# Patient Record
Sex: MALE | Race: WHITE
[De-identification: names, ages, dates, MRNs, and addresses within clinical notes are randomized per-mention and may not be internally consistent; named-entity substitution may affect disease eponyms.]

---

## 2019-06-19 ENCOUNTER — HOSPITAL ENCOUNTER (OUTPATIENT)
Dept: HOSPITAL 76 - LAB | Age: 79
Discharge: HOME | End: 2019-06-19
Attending: PHYSICIAN ASSISTANT
Payer: MEDICARE

## 2019-06-19 DIAGNOSIS — N30.01: Primary | ICD-10-CM

## 2019-06-19 LAB
ALBUMIN DIAFP-MCNC: 3.7 G/DL (ref 3.2–5.5)
ALBUMIN/GLOB SERPL: 1.2 {RATIO} (ref 1–2.2)
ALP SERPL-CCNC: 75 IU/L (ref 42–121)
ALT SERPL W P-5'-P-CCNC: 26 IU/L (ref 10–60)
ANION GAP SERPL CALCULATED.4IONS-SCNC: 11 MMOL/L (ref 6–13)
AST SERPL W P-5'-P-CCNC: 29 IU/L (ref 10–42)
BASOPHILS NFR BLD AUTO: 0 10^3/UL (ref 0–0.1)
BASOPHILS NFR BLD AUTO: 0.7 %
BILIRUB BLD-MCNC: 0.7 MG/DL (ref 0.2–1)
BUN SERPL-MCNC: 21 MG/DL (ref 6–20)
CALCIUM UR-MCNC: 9 MG/DL (ref 8.5–10.3)
CHLORIDE SERPL-SCNC: 101 MMOL/L (ref 101–111)
CO2 SERPL-SCNC: 26 MMOL/L (ref 21–32)
CREAT SERPLBLD-SCNC: 0.7 MG/DL (ref 0.6–1.2)
EOSINOPHIL # BLD AUTO: 0.2 10^3/UL (ref 0–0.7)
EOSINOPHIL NFR BLD AUTO: 3.5 %
ERYTHROCYTE [DISTWIDTH] IN BLOOD BY AUTOMATED COUNT: 12.2 % (ref 12–15)
GFRSERPLBLD MDRD-ARVRAT: 108 ML/MIN/{1.73_M2} (ref 89–?)
GLOBULIN SER-MCNC: 3 G/DL (ref 2.1–4.2)
GLUCOSE SERPL-MCNC: 194 MG/DL (ref 70–100)
HGB UR QL STRIP: 13 G/DL (ref 14–18)
LYMPHOCYTES # SPEC AUTO: 2 10^3/UL (ref 1.5–3.5)
LYMPHOCYTES NFR BLD AUTO: 36.3 %
MCH RBC QN AUTO: 29.5 PG (ref 27–31)
MCHC RBC AUTO-ENTMCNC: 31.5 G/DL (ref 32–36)
MCV RBC AUTO: 93.7 FL (ref 80–94)
MONOCYTES # BLD AUTO: 0.5 10^3/UL (ref 0–1)
MONOCYTES NFR BLD AUTO: 8.3 %
NEUTROPHILS # BLD AUTO: 2.8 10^3/UL (ref 1.5–6.6)
NEUTROPHILS # SNV AUTO: 5.4 X10^3/UL (ref 4.8–10.8)
NEUTROPHILS NFR BLD AUTO: 50.8 %
PDW BLD AUTO: 10.4 FL (ref 7.4–11.4)
PLATELET # BLD: 179 10^3/UL (ref 130–450)
PROT SPEC-MCNC: 6.7 G/DL (ref 6.7–8.2)
RBC MAR: 4.41 10^6/UL (ref 4.7–6.1)
SODIUM SERPLBLD-SCNC: 138 MMOL/L (ref 135–145)

## 2019-06-19 PROCEDURE — 36415 COLL VENOUS BLD VENIPUNCTURE: CPT

## 2019-06-19 PROCEDURE — 80053 COMPREHEN METABOLIC PANEL: CPT

## 2019-06-19 PROCEDURE — 85025 COMPLETE CBC W/AUTO DIFF WBC: CPT

## 2019-11-13 ENCOUNTER — HOSPITAL ENCOUNTER (OUTPATIENT)
Dept: HOSPITAL 76 - LAB | Age: 79
Discharge: HOME | End: 2019-11-13
Attending: UROLOGY
Payer: MEDICARE

## 2019-11-13 DIAGNOSIS — N40.1: ICD-10-CM

## 2019-11-13 DIAGNOSIS — R35.0: ICD-10-CM

## 2019-11-13 DIAGNOSIS — Z80.42: ICD-10-CM

## 2019-11-13 DIAGNOSIS — N50.0: Primary | ICD-10-CM

## 2019-11-13 PROCEDURE — 81599 UNLISTED MAAA: CPT

## 2019-11-13 PROCEDURE — 84402 ASSAY OF FREE TESTOSTERONE: CPT

## 2019-11-13 PROCEDURE — 84403 ASSAY OF TOTAL TESTOSTERONE: CPT

## 2019-11-13 PROCEDURE — 36415 COLL VENOUS BLD VENIPUNCTURE: CPT

## 2019-11-13 PROCEDURE — 84153 ASSAY OF PSA TOTAL: CPT

## 2020-06-12 ENCOUNTER — HOSPITAL ENCOUNTER (OUTPATIENT)
Dept: HOSPITAL 76 - EMS | Age: 80
Discharge: ANOTHER HOSPITAL | End: 2020-06-12
Attending: SURGERY
Payer: MEDICARE

## 2020-06-12 DIAGNOSIS — I21.4: Primary | ICD-10-CM

## 2020-11-17 ENCOUNTER — HOSPITAL ENCOUNTER (OUTPATIENT)
Dept: HOSPITAL 76 - COV | Age: 80
Discharge: HOME | End: 2020-11-17
Attending: INTERNAL MEDICINE
Payer: MEDICARE

## 2020-11-17 DIAGNOSIS — Z01.812: Primary | ICD-10-CM

## 2020-11-17 DIAGNOSIS — Z20.828: ICD-10-CM

## 2020-11-30 ENCOUNTER — HOSPITAL ENCOUNTER (OUTPATIENT)
Dept: HOSPITAL 76 - EMS | Age: 80
End: 2020-11-30
Attending: SURGERY
Payer: MEDICARE

## 2020-11-30 DIAGNOSIS — R04.0: Primary | ICD-10-CM

## 2021-03-24 ENCOUNTER — HOSPITAL ENCOUNTER (EMERGENCY)
Dept: HOSPITAL 76 - ED | Age: 81
Discharge: HOME | End: 2021-03-24
Payer: MEDICARE

## 2021-03-24 VITALS — SYSTOLIC BLOOD PRESSURE: 127 MMHG | DIASTOLIC BLOOD PRESSURE: 75 MMHG

## 2021-03-24 DIAGNOSIS — Z79.82: ICD-10-CM

## 2021-03-24 DIAGNOSIS — R06.02: ICD-10-CM

## 2021-03-24 DIAGNOSIS — R94.31: ICD-10-CM

## 2021-03-24 DIAGNOSIS — Z87.891: ICD-10-CM

## 2021-03-24 DIAGNOSIS — R53.83: Primary | ICD-10-CM

## 2021-03-24 DIAGNOSIS — Z95.5: ICD-10-CM

## 2021-03-24 DIAGNOSIS — I10: ICD-10-CM

## 2021-03-24 DIAGNOSIS — Z79.01: ICD-10-CM

## 2021-03-24 DIAGNOSIS — I25.10: ICD-10-CM

## 2021-03-24 LAB
ALBUMIN DIAFP-MCNC: 3.9 G/DL (ref 3.2–5.5)
ALBUMIN/GLOB SERPL: 1.4 {RATIO} (ref 1–2.2)
ALP SERPL-CCNC: 100 IU/L (ref 42–121)
ALT SERPL W P-5'-P-CCNC: 31 IU/L (ref 10–60)
ANION GAP SERPL CALCULATED.4IONS-SCNC: 10 MMOL/L (ref 6–13)
AST SERPL W P-5'-P-CCNC: 32 IU/L (ref 10–42)
BASOPHILS NFR BLD AUTO: 0 10^3/UL (ref 0–0.1)
BASOPHILS NFR BLD AUTO: 0.4 %
BILIRUB BLD-MCNC: 1.4 MG/DL (ref 0.2–1)
BUN SERPL-MCNC: 20 MG/DL (ref 6–20)
CALCIUM UR-MCNC: 9.2 MG/DL (ref 8.5–10.3)
CHLORIDE SERPL-SCNC: 101 MMOL/L (ref 101–111)
CHOLEST SERPL-MCNC: 135 MG/DL
CK SERPL-CCNC: 127 IU/L (ref 22–269)
CLARITY UR REFRACT.AUTO: CLEAR
CO2 SERPL-SCNC: 26 MMOL/L (ref 21–32)
CREAT SERPLBLD-SCNC: 0.8 MG/DL (ref 0.6–1.2)
EOSINOPHIL # BLD AUTO: 0.3 10^3/UL (ref 0–0.7)
EOSINOPHIL NFR BLD AUTO: 3.6 %
ERYTHROCYTE [DISTWIDTH] IN BLOOD BY AUTOMATED COUNT: 13.1 % (ref 12–15)
GFRSERPLBLD MDRD-ARVRAT: 92 ML/MIN/{1.73_M2} (ref 89–?)
GLOBULIN SER-MCNC: 2.8 G/DL (ref 2.1–4.2)
GLUCOSE SERPL-MCNC: 141 MG/DL (ref 70–100)
GLUCOSE UR QL STRIP.AUTO: NEGATIVE MG/DL
HCT VFR BLD AUTO: 43.4 % (ref 42–52)
HDLC SERPL-MCNC: 48 MG/DL
HDLC SERPL: 2.8 {RATIO} (ref ?–5)
HGB UR QL STRIP: 14 G/DL (ref 14–18)
KETONES UR QL STRIP.AUTO: NEGATIVE MG/DL
LDLC SERPL CALC-MCNC: 58 MG/DL
LDLC SERPL-MCNC: 61 MG/DL
LDLC/HDLC SERPL: 1.2 {RATIO} (ref ?–3.6)
LIPASE SERPL-CCNC: 24 U/L (ref 22–51)
LYMPHOCYTES # SPEC AUTO: 1.3 10^3/UL (ref 1.5–3.5)
LYMPHOCYTES NFR BLD AUTO: 19.2 %
MCH RBC QN AUTO: 30.4 PG (ref 27–31)
MCHC RBC AUTO-ENTMCNC: 32.3 G/DL (ref 32–36)
MCV RBC AUTO: 94.3 FL (ref 80–94)
MONOCYTES # BLD AUTO: 0.7 10^3/UL (ref 0–1)
MONOCYTES NFR BLD AUTO: 9.6 %
NEUTROPHILS # BLD AUTO: 4.7 10^3/UL (ref 1.5–6.6)
NEUTROPHILS # SNV AUTO: 7 X10^3/UL (ref 4.8–10.8)
NEUTROPHILS NFR BLD AUTO: 67.1 %
NITRITE UR QL STRIP.AUTO: NEGATIVE
NRBC # BLD AUTO: 0 /100WBC
NRBC # BLD AUTO: 0 X10^3/UL
PDW BLD AUTO: 12.2 FL (ref 7.4–11.4)
PH UR STRIP.AUTO: 6 PH (ref 5–7.5)
PLATELET # BLD: 144 10^3/UL (ref 130–450)
POTASSIUM SERPL-SCNC: 3.7 MMOL/L (ref 3.5–5)
PROT SPEC-MCNC: 6.7 G/DL (ref 6.7–8.2)
PROT UR STRIP.AUTO-MCNC: NEGATIVE MG/DL
RBC # UR STRIP.AUTO: NEGATIVE /UL
RBC MAR: 4.6 10^6/UL (ref 4.7–6.1)
SODIUM SERPLBLD-SCNC: 137 MMOL/L (ref 135–145)
SP GR UR STRIP.AUTO: <=1.005 (ref 1–1.03)
SQUAMOUS URNS QL MICRO: (no result)
TRIGL P FAST SERPL-MCNC: 147 MG/DL
UROBILINOGEN UR QL STRIP.AUTO: (no result) E.U./DL
UROBILINOGEN UR STRIP.AUTO-MCNC: NEGATIVE MG/DL
VLDLC SERPL-SCNC: 29 MG/DL
WBC # UR MANUAL: (no result) /HPF (ref 0–3)

## 2021-03-24 PROCEDURE — 93005 ELECTROCARDIOGRAM TRACING: CPT

## 2021-03-24 PROCEDURE — 83880 ASSAY OF NATRIURETIC PEPTIDE: CPT

## 2021-03-24 PROCEDURE — 87086 URINE CULTURE/COLONY COUNT: CPT

## 2021-03-24 PROCEDURE — 83690 ASSAY OF LIPASE: CPT

## 2021-03-24 PROCEDURE — 85025 COMPLETE CBC W/AUTO DIFF WBC: CPT

## 2021-03-24 PROCEDURE — 80061 LIPID PANEL: CPT

## 2021-03-24 PROCEDURE — 83721 ASSAY OF BLOOD LIPOPROTEIN: CPT

## 2021-03-24 PROCEDURE — 84443 ASSAY THYROID STIM HORMONE: CPT

## 2021-03-24 PROCEDURE — 36415 COLL VENOUS BLD VENIPUNCTURE: CPT

## 2021-03-24 PROCEDURE — 82550 ASSAY OF CK (CPK): CPT

## 2021-03-24 PROCEDURE — 81001 URINALYSIS AUTO W/SCOPE: CPT

## 2021-03-24 PROCEDURE — 80053 COMPREHEN METABOLIC PANEL: CPT

## 2021-03-24 PROCEDURE — 84484 ASSAY OF TROPONIN QUANT: CPT

## 2021-03-24 PROCEDURE — 99284 EMERGENCY DEPT VISIT MOD MDM: CPT

## 2021-03-24 NOTE — XRAY REPORT
PROCEDURE:  Chest 1 View X-Ray

 

INDICATIONS:  Chest pain

 

TECHNIQUE:  One view of the chest was acquired.  

 

COMPARISON:  6/12/2020.

 

FINDINGS:  

 

Surgical changes and devices:  None.  

 

Lungs and pleura:  No pleural effusions or pneumothorax.  Lungs are clear.  

 

Mediastinum:  Mediastinal contours appear normal.  Heart size is normal.  

 

Bones and chest wall:  No suspicious bony lesions.  Overlying soft tissues appear unremarkable.  

 

 

IMPRESSION:  

 

No acute cardiopulmonary disease process.

 

Reviewed by: Yasmine Juárez MD, PhD on 3/24/2021 11:20 AM PDT

Approved by: Yasmine Juárez MD, PhD on 3/24/2021 11:20 AM PDT

 

 

Station ID:  [id number]

## 2021-03-24 NOTE — ED PHYSICIAN DOCUMENTATION
History of Present Illness





- Stated complaint


Stated Complaint: SOA/LIGHT HEADED





- Chief complaint


Chief Complaint: Cardiac





- Additonal information


Additional information: 


81-year-old male presents the emergency department for evaluation of worsening 

fatigue and weakness after reported event about 1 week ago in which she was 

sitting and went to get up but got progressively dizzy and lightheaded and felt 

as though he was going to faint.  He states that since that event 1 week ago he 

has not felt right since he denies chest pain.  He does endorse shortness of 

breath that is not new from baseline.





He did have an NSTEMI event about 1 year ago in which he ultimately had 5 stents

placed in his heart.  He is currently on Plavix and aspirin and is followed by 

cardiology through [facility].  He last saw cardiology in 

January 2021 with a plan to have a 3-month follow-up to include an 

echocardiogram.  His echo is currently scheduled for mid April with the 

appointment with cardiology to follow.





At this time patient denies any cough, fevers, chest pain.  He has no leg 

swelling, nausea vomiting.  No focal or unilateral weakness.  No diarrhea.  He 

does endorse some dysuria but states that it is normal for him.





Former smoker, quit almost 50 years ago








Review of Systems


Constitutional: reports: Fatigue.  denies: Fever, Chills


Eyes: reports: Reviewed and negative


Ears: reports: Reviewed and negative


Nose: reports: Rhinorrhea / runny nose


Throat: reports: Reviewed and negative


Cardiac: denies: Chest pain / pressure, Palpitations, Pedal edema, Calf pain


Respiratory: reports: Dyspnea.  denies: Cough, Hemoptysis, Wheezing


GI: denies: Abdominal Pain, Nausea, Vomiting


: reports: Dysuria.  denies: Frequency, Hesitancy


Skin: denies: Rash, Lesions, Abrasion (s)


Musculoskeletal: denies: Neck pain, Back pain, Extremity pain


Neurologic: reports: Near syncope.  denies: Generalized weakness, Syncope, 

Confused, Altered mental status, Headache, LOC





PD PAST MEDICAL HISTORY





- Past Medical History


Cardiovascular: Hypertension, High cholesterol, Coronary artery disease


Respiratory: Other


GI: Other (reflux)





- Past Surgical History


Past Surgical History: Yes


General: Other (Nissen)


Cardiovascular: Coronary stent





- Present Medications


Home Medications: 


                                Ambulatory Orders











 Medication  Instructions  Recorded  Confirmed


 


Multivitamin [Multiple Vitamins] 1 each PO 03/15/17 03/15/17


 


Aspirin 81 mg DAILY 06/12/20 06/12/20


 


Atorvastatin Calcium 80 mg DAILY 06/12/20 06/12/20


 


Clopidogrel [Plavix] 1 tab DAILY 03/24/21 03/24/21


 


Metoprolol Succinate [Toprol Xl] 25 mg PO 03/24/21 


 


Omeprazole 1 tab DAILY 03/24/21 03/24/21


 


Tamsulosin [Flomax] 1 tab DAILY 03/24/21 03/24/21














- Allergies


Allergies/Adverse Reactions: 


                                    Allergies











Allergy/AdvReac Type Severity Reaction Status Date / Time


 


No Known Drug Allergies Allergy   Verified 03/24/21 11:02














- Social History


Does the pt smoke?: No


Smoking Status: Never smoker





PD ED PE EXPANDED





- General


General: Alert, No acute distress, Well developed/nourished





- Neck


Neck: Supple w/out meningeal sx, No tenderness.  No: JVD present, Adenopathy, 

Thyroid enlarged / mass





- Cardiac


Cardiac: Regular Rate, Regular Rhythm, Radial strong equal, Pedal strong equal, 

Cap refill < 2 sec





- Respiratory


Respiratory: Clear to ausultation mikael.  No: Distress, Labored





- Abdomen


Abdomen: Normal Bowel sounds.  No: Tender to palpation





- Back


Back: Normal exam, Soft tissue tenderness.  No: CVA TTP right, CVA TTP left





- Derm


Derm: Normal color, Warm and dry.  No: Rash





- Neuro


Neuro: Alert and Oriented X 3, CNII-XII intact





- GCS


Eye Opening: Spontaneous


Motor: Obeys Commands


Verbal: Oriented


Total: 15





Results





- Vitals


Vitals: 


                               Vital Signs - 24 hr











  03/24/21 03/24/21





  11:02 11:30


 


Temperature 36.4 C L 


 


Heart Rate 64 61


 


Respiratory 18 18





Rate  


 


Blood Pressure 127/68 127/75


 


O2 Saturation 100 100








                                     Oxygen











O2 Source                      Room air

















- EKG (time done)


  ** 1113


Rate: Rate (enter#) (59)


Rhythm: NSR


Axis: Normal


Intervals: Normal DC, Prolonged QT


QRS: Poor R wave progression


Ischemia: Normal ST segments


Compare to prior EKG: Unchanged from prior EKG


Computer interpretation: Agree with computer





- Labs


Labs: 


                                Laboratory Tests











  03/24/21 03/24/21 03/24/21





  11:12 11:12 11:12


 


WBC  7.0  


 


RBC  4.60 L  


 


Hgb  14.0  


 


Hct  43.4  


 


MCV  94.3 H  


 


MCH  30.4  


 


MCHC  32.3  


 


RDW  13.1  


 


Plt Count  144  


 


MPV  12.2 H  


 


Neut # (Auto)  4.7  


 


Lymph # (Auto)  1.3 L  


 


Mono # (Auto)  0.7  


 


Eos # (Auto)  0.3  


 


Baso # (Auto)  0.0  


 


Absolute Nucleated RBC  0.00  


 


Nucleated RBC %  0.0  


 


Sodium   137 


 


Potassium   3.7 


 


Chloride   101 


 


Carbon Dioxide   26 


 


Anion Gap   10.0 


 


BUN   20 


 


Creatinine   0.8 


 


Estimated GFR (MDRD)   92 


 


Glucose   141 H 


 


Calcium   9.2 


 


Total Bilirubin   1.4 H 


 


AST   32 


 


ALT   31 


 


Alkaline Phosphatase   100 


 


Troponin I High Sens    6.9


 


B-Natriuretic Peptide   


 


Total Protein   6.7 


 


Albumin   3.9 


 


Globulin   2.8 


 


Albumin/Globulin Ratio   1.4 


 


Lipase   24 


 


TSH   


 


Urine Color   


 


Urine Clarity   


 


Urine pH   


 


Ur Specific Gravity   


 


Urine Protein   


 


Urine Glucose (UA)   


 


Urine Ketones   


 


Urine Occult Blood   


 


Urine Nitrite   


 


Urine Bilirubin   


 


Urine Urobilinogen   


 


Ur Leukocyte Esterase   


 


Urine RBC   


 


Urine WBC   


 


Ur Squamous Epith Cells   


 


Urine Bacteria   


 


Urine Culture Comments   














  03/24/21 03/24/21 03/24/21





  11:23 11:34 11:34


 


WBC   


 


RBC   


 


Hgb   


 


Hct   


 


MCV   


 


MCH   


 


MCHC   


 


RDW   


 


Plt Count   


 


MPV   


 


Neut # (Auto)   


 


Lymph # (Auto)   


 


Mono # (Auto)   


 


Eos # (Auto)   


 


Baso # (Auto)   


 


Absolute Nucleated RBC   


 


Nucleated RBC %   


 


Sodium   


 


Potassium   


 


Chloride   


 


Carbon Dioxide   


 


Anion Gap   


 


BUN   


 


Creatinine   


 


Estimated GFR (MDRD)   


 


Glucose   


 


Calcium   


 


Total Bilirubin   


 


AST   


 


ALT   


 


Alkaline Phosphatase   


 


Troponin I High Sens   


 


B-Natriuretic Peptide   101 H 


 


Total Protein   


 


Albumin   


 


Globulin   


 


Albumin/Globulin Ratio   


 


Lipase   


 


TSH    1.59


 


Urine Color  YELLOW  


 


Urine Clarity  CLEAR  


 


Urine pH  6.0  


 


Ur Specific Gravity  <=1.005  


 


Urine Protein  NEGATIVE  


 


Urine Glucose (UA)  NEGATIVE  


 


Urine Ketones  NEGATIVE  


 


Urine Occult Blood  NEGATIVE  


 


Urine Nitrite  NEGATIVE  


 


Urine Bilirubin  NEGATIVE  


 


Urine Urobilinogen  0.2 (NORMAL)  


 


Ur Leukocyte Esterase  NEGATIVE  


 


Urine RBC  None Seen  


 


Urine WBC  0-3  


 


Ur Squamous Epith Cells  RARE Squamous  


 


Urine Bacteria  None Seen  


 


Urine Culture Comments  NOT INDICATED  














- Rads (name of study)


  ** CXR


Radiology: Final report received (No acute cardiopulmonary process)





PD MEDICAL DECISION MAKING





- ED course


Complexity details: reviewed results, re-evaluated patient, considered 

differential, d/w patient


ED course: 





81-year-old male presents the emergency department for evaluation of shortness 

of breath and fatigue for about 1 week.  He does have a history of previous 

coronary artery disease status post stenting x5.  He is appropriately on Plavix 

and aspirin.  Today's EKG is nonischemic high-sensitivity troponin negative 

chest x-ray shows no acute focal infiltrates.  BNP essentially normal.  

Cardiopulmonary exam without adventitious findings he has no leg swelling or 

crackles.  We did check screening labs including a CBC which were unremarkable 

as well as a thyroid also unremarkable.  This gentleman does have follow-up 

scheduled with his cardiologist next month which will include an echo.  Pt 

denies Chest pain at tis time. At this time he appears very stable for discharge

 home.  Emergent return precautions discussed








Departure





- Departure


Disposition: 01 Home, Self Care


Clinical Impression: 


 Shortness of breath





Fatigue


Qualifiers:


 Fatigue type: unspecified Qualified Code(s): R53.83 - Other fatigue





Condition: Stable


Record reviewed to determine appropriate education?: Yes


Comments: 


Mark leblanc are seen today in the emergency department for some shortness of 

breath as well as fatigue following an unclear event about 1 week ago.  Today's 

chest x-ray is normal for age.  Your EKG does not look worrisome.  All your 

screening labs including your electrolytes your CBC your thyroid and your 

troponin are normal.  It is important you continue to follow-up with your 

primary care provider next month as scheduled.





If at any point you develop sudden shortness of breath, have fevers, cough, 

develop chest pain especially with exertion please return immediately to the 

emergency department.

## 2021-08-17 ENCOUNTER — HOSPITAL ENCOUNTER (EMERGENCY)
Dept: HOSPITAL 76 - ED | Age: 81
Discharge: HOME | End: 2021-08-17
Payer: MEDICARE

## 2021-08-17 VITALS — DIASTOLIC BLOOD PRESSURE: 61 MMHG | SYSTOLIC BLOOD PRESSURE: 106 MMHG
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
## 2021-08-17 DIAGNOSIS — I10: ICD-10-CM
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
## 2021-08-17 DIAGNOSIS — Z79.02: ICD-10-CM

## 2021-08-17 DIAGNOSIS — Z95.5: ICD-10-CM

## 2021-08-17 DIAGNOSIS — S51.812A: Primary | ICD-10-CM

## 2021-08-17 PROCEDURE — 12004 RPR S/N/AX/GEN/TRK7.6-12.5CM: CPT

## 2021-08-17 PROCEDURE — 99282 EMERGENCY DEPT VISIT SF MDM: CPT

## 2021-08-17 NOTE — ED PHYSICIAN DOCUMENTATION
PD HPI UPPER EXT INJURY





- Stated complaint


Stated Complaint: LT ARM INJ/WOUND





- Chief complaint


Chief Complaint: Laceration





- History obtained from


History obtained from: Patient





- History of Present Illness


Location: Left, Forearm


Type of injury: Fall (The patient was carrying a tray down steps and slipped and

caught his arm on the railing.  Unclear the exact sharp edge but it caused a 

tear along the forearm quite large.)


Where injury occurred: Home


Timing - onset: Today (just PTA)


Timing - details: Abrupt onset, Still present


Worsened by: Moving, Palpating


Contributing factors: No: Anticoagulated (he is on antiplatelet Plavix and 

bleeds easily; no anticoag.)


Similar symptoms before: Has not had sx before


Recently seen: Not recently seen





Review of Systems


Neurologic: denies: Focal weakness, Numbness, Altered mental status, Head injury





PD PAST MEDICAL HISTORY





- Past Medical History


Cardiovascular: Hypertension, High cholesterol, Coronary artery disease


Respiratory: Other


GI: Other (reflux)





- Past Surgical History


Past Surgical History: Yes


General: Other (Nissen)


Cardiovascular: Coronary stent





- Present Medications


Home Medications: 


                                Ambulatory Orders











 Medication  Instructions  Recorded  Confirmed


 


Multivitamin [Multiple Vitamins] 1 each PO DAILY 03/15/17 08/17/21


 


Atorvastatin Calcium 80 mg ORAL DAILY 06/12/20 08/17/21


 


Clopidogrel [Plavix] 1 tab ORAL DAILY 03/24/21 08/17/21


 


Metoprolol Succinate [Toprol Xl] 12.5 mg PO DAILY 03/24/21 08/17/21


 


Omeprazole 1 tab ORAL DAILY 03/24/21 08/17/21


 


Tamsulosin [Flomax] 1 tab DAILY 03/24/21 08/17/21


 


Vitamin B Complex Vit C No.3 [B 1 each PO DAILY 08/17/21 08/17/21





Complex with Vitamin C]   














- Allergies


Allergies/Adverse Reactions: 


                                    Allergies











Allergy/AdvReac Type Severity Reaction Status Date / Time


 


No Known Drug Allergies Allergy   Verified 08/17/21 14:37














- Social History


Does the pt smoke?: No


Smoking Status: Never smoker


Does the pt have substance abuse?: No





PD ED PE NORMAL





- Vitals


Vital signs reviewed: Yes





- General


General: Alert and oriented X 3, No acute distress (He does seem in some 

discomfort from the forearm laceration.), Well developed/nourished





- HEENT


HEENT: Atraumatic





- Derm


Derm: Normal color, Warm and dry





- Extremities


Extremities: Other (left medial volar forearm with laceration vertically, 12 cm 

in length, to fatty layer. Mild bleeding. No FB. Good wrist movement and . )





- Neuro


Neuro: Alert and oriented X 3, No motor deficit, No sensory deficit, Normal 

speech





Results





- Vitals


Vitals: 


                               Vital Signs - 24 hr











  08/17/21





  14:37


 


Temperature 36.4 C L


 


Heart Rate 75


 


Respiratory 16





Rate 


 


Blood Pressure 125/65


 


O2 Saturation 96








                                     Oxygen











O2 Source                      Room air

















Procedures





- Laceration (location)


  ** left forearm


Wound type: Linear, Into subcut fat, Clean


Neurovascular status: Sensory intact, Motor intact, Vascular intact


Anesthesia: Lidocaine 1% with epi


Wound preparation: Wound explored, To the base, debridement of wound edges 

(traumatic laceration/avulsion)


Deep layer closure: Vicryl, size #-0 - enter number (4)


Skin layer closure: Nylon, Running, Size #-0 - enter number (4), Sutures - enter

# (29)


Other: Patient tolerated well, No complications, Neurovascular intact, Dressing 

applied, Tetanus booster given





Departure





- Departure


Disposition: 01 Home, Self Care


Clinical Impression: 


Forearm laceration


Qualifiers:


 Encounter type: initial encounter Laterality: left Qualified Code(s): S51.812A 

- Laceration without foreign body of left forearm, initial encounter





Condition: Stable


Record reviewed to determine appropriate education?: Yes


Instructions:  ED Laceration All


Follow-Up: 


Joni Yang MD [Primary Care Provider] - 


Comments: 


It is okay to wash and shower. Clean off the wound twice a day with soap and 

water, or peroxide and water. Apply some antibiotic ointment to it to keep it 

moist. Also to watch for signs of infection such as purulence, redness or 

increasing pain. Return to your primary care or the ER at the specified time for

suture removal.





Suture removal 10 to 12 days.





Tylenol every 4 hours if needed for pain.





Light activity with the arm for the first several days to a week to not work at 

the stitches too much.  That said, light use of the arm will actually be good 

for keeping the healing tissue a little bit looser.

## 2023-02-10 ENCOUNTER — HOSPITAL ENCOUNTER (OUTPATIENT)
Dept: HOSPITAL 76 - LAB | Age: 83
Discharge: HOME | End: 2023-02-10
Attending: INTERNAL MEDICINE
Payer: MEDICARE

## 2023-02-10 DIAGNOSIS — E78.5: ICD-10-CM

## 2023-02-10 DIAGNOSIS — R60.0: Primary | ICD-10-CM

## 2023-02-10 DIAGNOSIS — I25.10: ICD-10-CM

## 2023-02-10 LAB
ALBUMIN DIAFP-MCNC: 3.7 G/DL (ref 3.2–5.5)
ALBUMIN/GLOB SERPL: 1.2 {RATIO} (ref 1–2.2)
ALP SERPL-CCNC: 100 IU/L (ref 42–121)
ALT SERPL W P-5'-P-CCNC: 26 IU/L (ref 10–60)
ANION GAP SERPL CALCULATED.4IONS-SCNC: 11 MMOL/L (ref 6–13)
AST SERPL W P-5'-P-CCNC: 29 IU/L (ref 10–42)
BILIRUB BLD-MCNC: 0.9 MG/DL (ref 0.2–1)
BUN SERPL-MCNC: 21 MG/DL (ref 6–20)
CALCIUM UR-MCNC: 9.6 MG/DL (ref 8.5–10.3)
CHLORIDE SERPL-SCNC: 101 MMOL/L (ref 101–111)
CHOLEST SERPL-MCNC: 118 MG/DL
CK SERPL-CCNC: 74 IU/L (ref 22–269)
CO2 SERPL-SCNC: 26 MMOL/L (ref 21–32)
CREAT SERPLBLD-SCNC: 0.7 MG/DL (ref 0.6–1.2)
GFRSERPLBLD MDRD-ARVRAT: 107 ML/MIN/{1.73_M2} (ref 89–?)
GLOBULIN SER-MCNC: 3 G/DL (ref 2.1–4.2)
GLUCOSE SERPL-MCNC: 139 MG/DL (ref 70–100)
HDLC SERPL-MCNC: 55 MG/DL
HDLC SERPL: 2.1 {RATIO} (ref ?–5)
LDLC SERPL CALC-MCNC: 47 MG/DL
LDLC SERPL-MCNC: 50 MG/DL
LDLC/HDLC SERPL: 0.9 {RATIO} (ref ?–3.6)
POTASSIUM SERPL-SCNC: 4 MMOL/L (ref 3.5–5)
PROT SPEC-MCNC: 6.7 G/DL (ref 6.7–8.2)
SODIUM SERPLBLD-SCNC: 138 MMOL/L (ref 135–145)
TRIGL P FAST SERPL-MCNC: 80 MG/DL
VLDLC SERPL-SCNC: 16 MG/DL

## 2023-02-10 PROCEDURE — 80053 COMPREHEN METABOLIC PANEL: CPT

## 2023-02-10 PROCEDURE — 80061 LIPID PANEL: CPT

## 2023-02-10 PROCEDURE — 82550 ASSAY OF CK (CPK): CPT

## 2023-02-10 PROCEDURE — 83721 ASSAY OF BLOOD LIPOPROTEIN: CPT

## 2023-02-10 PROCEDURE — 36415 COLL VENOUS BLD VENIPUNCTURE: CPT

## 2024-09-19 ENCOUNTER — HOSPITAL ENCOUNTER (EMERGENCY)
Dept: HOSPITAL 76 - ED | Age: 84
Discharge: HOME | End: 2024-09-19
Payer: MEDICARE

## 2024-09-19 VITALS — OXYGEN SATURATION: 97 % | SYSTOLIC BLOOD PRESSURE: 117 MMHG | DIASTOLIC BLOOD PRESSURE: 82 MMHG

## 2024-09-19 DIAGNOSIS — J84.10: ICD-10-CM

## 2024-09-19 DIAGNOSIS — R07.9: ICD-10-CM

## 2024-09-19 DIAGNOSIS — M25.511: ICD-10-CM

## 2024-09-19 DIAGNOSIS — M25.512: ICD-10-CM

## 2024-09-19 DIAGNOSIS — U07.1: Primary | ICD-10-CM

## 2024-09-19 LAB
ALBUMIN DIAFP-MCNC: 4.1 G/DL (ref 3.2–5.5)
ALBUMIN/GLOB SERPL: 1.5 {RATIO} (ref 1–2.2)
ALP SERPL-CCNC: 105 IU/L (ref 42–121)
ALT SERPL W P-5'-P-CCNC: 19 IU/L (ref 10–60)
ANION GAP SERPL CALCULATED.4IONS-SCNC: 7 MMOL/L (ref 6–13)
AST SERPL W P-5'-P-CCNC: 22 IU/L (ref 10–42)
B PARAPERT DNA SPEC QL NAA+PROBE: NOT DETECTED
B PERT DNA SPEC QL NAA+PROBE: NOT DETECTED
BASOPHILS NFR BLD AUTO: 0 10^3/UL (ref 0–0.1)
BASOPHILS NFR BLD AUTO: 0.3 %
BILIRUB BLD-MCNC: 0.7 MG/DL (ref 0.2–1)
BUN SERPL-MCNC: 26 MG/DL (ref 6–20)
C PNEUM DNA NPH QL NAA+NON-PROBE: NOT DETECTED
CALCIUM UR-MCNC: 9.5 MG/DL (ref 8.5–10.3)
CARDIAC TROPONIN I PNL SERPL HS: 8 NG/L (ref 2.3–19.7)
CHLORIDE SERPL-SCNC: 103 MMOL/L (ref 101–111)
CO2 SERPL-SCNC: 27 MMOL/L (ref 21–32)
CREAT SERPLBLD-SCNC: 0.9 MG/DL (ref 0.6–1.3)
EOSINOPHIL # BLD AUTO: 0.1 10^3/UL (ref 0–0.7)
EOSINOPHIL NFR BLD AUTO: 0.4 %
ERYTHROCYTE [DISTWIDTH] IN BLOOD BY AUTOMATED COUNT: 13.3 % (ref 12–15)
FLUAV RNA RESP QL NAA+PROBE: NOT DETECTED
GFRSERPLBLD MDRD-ARVRAT: 80 ML/MIN/{1.73_M2} (ref 89–?)
GLOBULIN SER-MCNC: 2.8 G/DL (ref 2.1–4.2)
GLUCOSE SERPL-MCNC: 110 MG/DL (ref 74–104)
HAEM INFLU B DNA SPEC QL NAA+PROBE: NOT DETECTED
HCOV 229E RNA SPEC QL NAA+PROBE: NOT DETECTED
HCOV HKU1 RNA UPPER RESP QL NAA+PROBE: NOT DETECTED
HCOV NL63 RNA ASPIRATE QL NAA+PROBE: NOT DETECTED
HCOV OC43 RNA SPEC QL NAA+PROBE: NOT DETECTED
HCT VFR BLD AUTO: 41.3 % (ref 42–52)
HGB UR QL STRIP: 13.1 G/DL (ref 14–18)
HMPV AG SPEC QL: NOT DETECTED
HPIV1 RNA NPH QL NAA+PROBE: NOT DETECTED
HPIV2 SPEC QL CULT: NOT DETECTED
HPIV3 AB TITR SER CF: NOT DETECTED {TITER}
HPIV4 RNA SPEC QL NAA+PROBE: NOT DETECTED
LIPASE SERPL-CCNC: 14 U/L (ref 11–82)
LYMPHOCYTES # SPEC AUTO: 0.9 10^3/UL (ref 1.5–3.5)
LYMPHOCYTES NFR BLD AUTO: 7.5 %
M PNEUMO DNA SPEC QL NAA+PROBE: NOT DETECTED
MCH RBC QN AUTO: 29.6 PG (ref 27–31)
MCHC RBC AUTO-ENTMCNC: 31.7 G/DL (ref 32–36)
MCV RBC AUTO: 93.4 FL (ref 80–94)
MONOCYTES # BLD AUTO: 0.9 10^3/UL (ref 0–1)
MONOCYTES NFR BLD AUTO: 7.5 %
NEUTROPHILS # BLD AUTO: 10.5 10^3/UL (ref 1.5–6.6)
NEUTROPHILS # SNV AUTO: 12.5 X10^3/UL (ref 4.8–10.8)
NEUTROPHILS NFR BLD AUTO: 84 %
NRBC # BLD AUTO: 0 /100WBC
NRBC # BLD AUTO: 0 X10^3/UL
PDW BLD AUTO: 12 FL (ref 7.4–11.4)
PLATELET # BLD: 169 10^3/UL (ref 130–450)
POTASSIUM SERPL-SCNC: 4.2 MMOL/L (ref 3.5–4.5)
PROT SPEC-MCNC: 6.9 G/DL (ref 6.4–8.9)
RBC MAR: 4.42 10^6/UL (ref 4.7–6.1)
RSV RNA RESP QL NAA+PROBE: NOT DETECTED
RV+EV RNA SPEC QL NAA+PROBE: NOT DETECTED
SARS-COV-2 RNA PNL SPEC NAA+PROBE: DETECTED
SODIUM SERPLBLD-SCNC: 137 MMOL/L (ref 135–145)

## 2024-09-19 PROCEDURE — 80053 COMPREHEN METABOLIC PANEL: CPT

## 2024-09-19 PROCEDURE — 36415 COLL VENOUS BLD VENIPUNCTURE: CPT

## 2024-09-19 PROCEDURE — 87633 RESP VIRUS 12-25 TARGETS: CPT

## 2024-09-19 PROCEDURE — 83690 ASSAY OF LIPASE: CPT

## 2024-09-19 PROCEDURE — 93005 ELECTROCARDIOGRAM TRACING: CPT

## 2024-09-19 PROCEDURE — 84484 ASSAY OF TROPONIN QUANT: CPT

## 2024-09-19 PROCEDURE — 99284 EMERGENCY DEPT VISIT MOD MDM: CPT

## 2024-09-19 PROCEDURE — 85025 COMPLETE CBC W/AUTO DIFF WBC: CPT

## 2024-09-19 PROCEDURE — 96374 THER/PROPH/DIAG INJ IV PUSH: CPT

## 2024-09-19 PROCEDURE — 71275 CT ANGIOGRAPHY CHEST: CPT

## 2024-09-19 RX ADMIN — KETOROLAC TROMETHAMINE STA MG: 30 INJECTION, SOLUTION INTRAMUSCULAR at 20:38

## 2024-09-19 RX ADMIN — HYDROCODONE BITARTRATE AND ACETAMINOPHEN STA BOTTLE: 5; 325 TABLET ORAL at 22:40

## 2024-09-19 RX ADMIN — MORPHINE SULFATE STA: 2 INJECTION, SOLUTION INTRAMUSCULAR; INTRAVENOUS at 20:42

## 2024-09-19 NOTE — ED PHYSICIAN DOCUMENTATION
History of Present Illness





- Stated complaint


Stated Complaint: SOA





- Chief complaint


Chief Complaint: Resp





- History obtained from


History obtained from: Patient





- Additonal information


Additional information: 





HPI from patient.


Patient complains of rapid onset of pleuritic midline chest pain.  Onset was 

approximately noon today while at rest.  The pain radiates to bilateral 

shoulders but not to his back.  He denies history of similar symptoms. He has a 

chronic cough but does not feel his cough is any different or worse today. 

Denies hemoptysis.  Denies fever.  He had recent airplane travel to/from [place] 

(last week).  Has not noticed any leg swelling.  He says that, approximately 2 

weeks ago, he had signs/symptoms that he felt were consistent with COVID, took a

home test and he says the result was positive for COVID.  However, he says all 

of the symptoms he had at that time have completely resolved.


Past medical history includes MI with 3 stents (approximately 4 years ago).





PD PAST MEDICAL HISTORY





- Past Medical History


Cardiovascular: Hypertension, High cholesterol, Coronary artery disease


Respiratory: Other


Neuro: Migraines


Endocrine/Autoimmune: None


GI: GERD, Other


: Benign prostate hypertrophy


Psych: None


Musculoskeletal: None


Derm: Other


Other Past Medical History: pulmonary fibrosis





- Past Surgical History


Past Surgical History: Yes


General: Hiatal hernia repair, Other


Cardiovascular: Coronary stent





- Present Medications


Home Medications: 


                                Ambulatory Orders











 Medication  Instructions  Recorded  Confirmed


 


Multivitamin [Multiple Vitamins] 1 each PO DAILY 03/15/17 08/17/21


 


Atorvastatin Calcium 80 mg ORAL DAILY 06/12/20 08/17/21


 


Clopidogrel [Plavix] 1 tab ORAL DAILY 03/24/21 08/17/21


 


Metoprolol Succinate [Toprol Xl] 12.5 mg PO DAILY 03/24/21 08/17/21


 


Omeprazole 1 tab ORAL DAILY 03/24/21 08/17/21


 


Tamsulosin [Flomax] 1 tab DAILY 03/24/21 08/17/21


 


Vitamin B Complex Vit C No.3 [B 1 each PO DAILY 08/17/21 08/17/21





Complex with Vitamin C]   


 


traMADol [Ultram] 50 mg PO Q4-6H PRN #14 tablet 09/19/24 














- Allergies


Allergies/Adverse Reactions: 


                                    Allergies











Allergy/AdvReac Type Severity Reaction Status Date / Time


 


No Known Drug Allergies Allergy   Verified 09/19/24 19:10














- Social History


Does the pt smoke?: No


Smoking Status: Never smoker


Does the pt drink ETOH?: No


Does the pt have substance abuse?: No





- Immunizations


Immunizations are current?: No


Immunizations: TDAP >10years/unknown





PD ED PE NORMAL





- Vitals


Vital signs reviewed: Yes





- General


General: Alert and oriented X 3, No acute distress, Well developed/nourished





- HEENT


HEENT: Moist mucous membranes





- Neck


Neck: Supple, no meningeal sign





- Cardiac


Cardiac: RRR, No murmur, No gallop, No rub





- Respiratory


Respiratory: No respiratory distress, Clear bilaterally





- Abdomen


Abdomen: Soft, Non tender, Non distended





- Back


Back: No spinal TTP





- Derm


Derm: Normal color, Warm and dry, No rash





- Extremities


Extremities: No edema





Results





- Vitals


Vitals: 


                                     Oxygen











O2 Source                      Room air

















- EKG (time done)


  ** No standard instances


EKG releavant findings:: 


EKG personally interpreted by author of this note. Relevant findings are: 





Rate: Rate (enter#) (94)


Rhythm: NSR


Axis: LAD


Intervals: Normal DC, QRS normal


QRS: Normal


Ischemia: Normal ST segments


Other comments: Other comments (unifocal PVCs; moderate artifact that does not 

preclude interpretation )


Computer interpretation: Disagree with computer (no ST abnormalities including 

no ST elevation )





- Labs


Labs: 


                                Laboratory Tests











  09/19/24 09/19/24 09/19/24





  19:13 19:52 19:52


 


WBC   12.5 H 


 


RBC   4.42 L 


 


Hgb   13.1 L 


 


Hct   41.3 L 


 


MCV   93.4 


 


MCH   29.6 


 


MCHC   31.7 L 


 


RDW   13.3 


 


Plt Count   169 


 


MPV   12.0 H 


 


Neut # (Auto)   10.5 H 


 


Lymph # (Auto)   0.9 L 


 


Mono # (Auto)   0.9 


 


Eos # (Auto)   0.1 


 


Baso # (Auto)   0.0 


 


Absolute Nucleated RBC   0.00 


 


Nucleated RBC %   0.0 


 


Sodium    137


 


Potassium    4.2


 


Chloride    103


 


Carbon Dioxide    27


 


Anion Gap    7.0


 


BUN    26 H


 


Creatinine    0.9


 


Estimated GFR (MDRD)    80 L


 


Glucose    110 H


 


Calcium    9.5


 


Total Bilirubin    0.7


 


AST    22


 


ALT    19


 


Alkaline Phosphatase    105


 


Troponin I High Sens    8.0


 


Total Protein    6.9


 


Albumin    4.1


 


Globulin    2.8


 


Albumin/Globulin Ratio    1.5


 


Lipase    14


 


Nasal Adenovirus (PCR)  NOT DETECTED  


 


Nasal B. parapertussis DNA (PCR)  NOT DETECTED  


 


Nasal Coronavir 229E PCR  NOT DETECTED  


 


Nasal Coronavir HKU1 PCR  NOT DETECTED  


 


Nasal Coronavir NL63 PCR  NOT DETECTED  


 


Nasal Coronavir OC43 PCR  NOT DETECTED  


 


Nasal Enterovir/Rhinovir PCR  NOT DETECTED  


 


Nasal Influenza B PCR  NOT DETECTED  


 


Nasal Influenza A PCR  NOT DETECTED  


 


Nasal Parainfluen 1 PCR  NOT DETECTED  


 


Nasal Parainfluen 2 PCR  NOT DETECTED  


 


Nasal Parainfluen 3 PCR  NOT DETECTED  


 


Nasal Parainfluen 4 PCR  NOT DETECTED  


 


Nasal RSV (PCR)  NOT DETECTED  


 


Nasal B.pertussis DNA PCR  NOT DETECTED  


 


Nasal C.pneumoniae (PCR)  NOT DETECTED  


 


Malik Human Metapneumo PCR  NOT DETECTED  


 


Nasal M.pneumoniae (PCR)  NOT DETECTED  


 


Nasal SARS-CoV-2 (PCR)  DETECTED A  














- Rads (name of study)


  ** CTA chest


Relevant Findings:: Prelim report reviewed, See rad report





PD Medical Decision Making





- ED course


Complexity details: reviewed results, re-evaluated patient, considered 

differential, d/w patient


ED course: 





Patient asking for pain medication but declined IV morphine, saying he doesn't 

want anything that strong. He is given 30mg IV toradol and subsequently reports 

resolution of his symptoms. 


No concerning nor diagnostic findings on EKG (see above for my interpretation), 

blood tests. Mild leukocytosis noted (12.5 wbc). Normal LFTs. Normal hs-cTn 

(8.0). Respiratory PCR positive for SARS-COV-2 (as noted in HPI, he tested 

positive at home two weeks ago for COVID; his current symptoms are not typical 

for COVID and thus this is likely an incidental finding). 


CTA chest without evidence of acute/emergent process such as PE or aortic 

syndrome (such as aneurysm, dissection). There are findings on CT c/w ILD, but 

patient says he has diagnosis of pulmonary fibrosis and thus these findings are 

expected. 


Etiology of patient's symptoms is not apparent at this time. Results reviewed 

w/patient, return precautions discussed, and advised to seek follow up with his 

PCP and his cardiologist as soon as he can arrange to do so. He is given take-

home pack of vicodin (he is asking for pain medication for fear that it will 

recur, although he also is preferring non-narcotic options if appropriate. There

 are no take-home analgesics I can provide that are non-narcotic, and he has 

already been given NSAID (toradol) in ED and taken acetaminophen PTA although he

 says he only took 250mg (by breaking 500mg tablet in half)). He says he has had

 vicodin before and tolerates it well. I am e-prescribing tramadol for him, as 

well. 





Departure





- Departure


Disposition: 01 Home, Self Care


Clinical Impression: 


 COVID-19





Chest pain


Qualifiers:


 Chest pain type: unspecified Qualified Code(s): R07.9 - Chest pain, unspecified





Condition: Good


Instructions:  ED Chest Pain Atypical Unkn Cause


Prescriptions: 


traMADol [Ultram] 50 mg PO Q4-6H PRN #14 tablet


 PRN Reason: Pain 5-7


Comments: 


You tested positive tonight for COVID.  As we discussed, your symptoms are 

atypical for COVID and thus I suspect this result is incidental (unrelated to 

your symptoms). Fortunately, the results of your blood tests, EKG and CT scan of

 your chest have no concerning (nor diagnostic) findings.  This is reassuring, 

but the cause of your symptoms remains unclear. 


Contact your primary care provider in the morning when the office opens to 

arrange for the next available appointment for follow-up/reevaluation.


I have electronically submitted a prescription for tramadol (narcotic/opiate 

pain medication) to the [facility] in [place].





I am prescribing a short course of narcotic pain medication for you. These are 

potentially dangerous and addictive medications that should be used carefully. 


 These medications may constipate you. Take an over-the-counter stool softener 

(docusate) twice daily with plenty of water while taking these medications. If 

you go 24 hours without a bowel movement, take over-the-counter miralax, per 

package instructions.


 Do not drink or drive while taking these medications. 


 If you received narcotic or sedating medications while in the emergency 

department, do not drive for 24 hours.


 Store this medication in a safe, secure place and out of reach of children. 


 It is a violation of federal law to give or sell this medication to another 

person or to use in a manner other than prescribed.


 The ED will not refill narcotic prescriptions, including prescriptions lost or 

stolen.


 To dispose of unwanted medications:


 1. [facility] at [address] in 

[place] has a medication drop box. They accept prescription medications (in 

pill form) Monday through Friday 9:00 a.m. to 5:00 p.m. 


 2. [facility] [employer] [facility] accepts prescription medications 

(in pill form only) for disposal year round. Call [phone] for more 

information. 


 3. Contact the [facility] for the next [facility] sponsored prescription 

drug collection event. [phone], [phone], or [phone] 

[phone];


Discharge Date/Time: 09/19/24 23:05

## 2024-09-19 NOTE — CT REPORT
PROCEDURE:  Angio Chest

 

INDICATIONS:  pleuritic chest pain

 

CONTRAST: 80ml [id number] 

 

TECHNIQUE:  

After the administration of intravenous contrast, 2 mm axial images were acquired from the pulmonary 
apices to the posterior costophrenic angles during the arterial phase. In addition, 1 mm lung kernel 
and 5 mm soft tissue kernel reconstructions were performed. 3-dimensional coronal oblique maximum int
ensity projection (MIP) reformats, 8 mm axial MIP, and 5 mm coronal and sagittal MPR reformats were t
hen performed through the thorax. For radiation dose reduction, the following was used: automated exp
osure control, adjustment of mA and/or kV according to patient size.

 

 

COMPARISON:  3/24/2021 radiograph

 

FINDINGS:  

Image quality: Diagnostic

 

Lungs and pleura:Background moderate emphysematous changes and peripheral reticulation. No dense cons
olidation or pleural effusion. Possible mild superimposed basilar opacities. There may also be micron
odules and granulomas.

 

Mediastinum, heart, and esophagus: Distention of the pulmonary trunk indicating chronically high pulm
onary pressures. No acute pulmonary embolism. Cardiomegaly. Postsurgical changes at the distal esopha
catherine and moderate hiatal hernia. Coronary calcifications. No pathologic lymph nodes by size criteria. 
Prominent mediastinal hilar lymph nodes, possibly reactive.

 

Chest wall and thyroid: Unremarkable

 

Upper abdomen: Possible liver cysts. The upper abdomen is only partially visualized

 

Bones: No acute or suspicious osseous finding. 

 

IMPRESSION:

Peripheral reticulation and possible additional opacities in the lungs, with background emphysema. Co
rrelate PFTs for ILD. Basal opacities may represent additional atelectasis or infection. Consider abbey
veillance imaging, possibly with high-resolution protocol.

 

No acute pulmonary embolism.

 

Other findings above.

 

Reviewed by: Kristophre Peoples MD on 9/19/2024 9:21 PM PDT

Approved by: Kristopher Peoples MD on 9/19/2024 9:21 PM PDT

 

 

Station ID:  [id number]